# Patient Record
(demographics unavailable — no encounter records)

---

## 2025-01-28 NOTE — HISTORY OF PRESENT ILLNESS
[FreeTextEntry6] : bad eczema scratches often  does give benadryl and claritin at times uses steroids  2-3 days then stops

## 2025-01-28 NOTE — PHYSICAL EXAM
[NL] : regular rate and rhythm, normal S1, S2 audible, no murmurs [de-identified] : dry red hands

## 2025-04-30 NOTE — DISCUSSION/SUMMARY
[FreeTextEntry1] : drops and zyrtec  keep windows closed minimal outdoor play  sunglasses  wash nightly   albuterol 2 puffs every 4 hrs   rto tomorrow

## 2025-04-30 NOTE — PHYSICAL EXAM
[Wheezing] : wheezing [NL] : regular rate and rhythm, normal S1, S2 audible, no murmurs [FreeTextEntry5] : red and tearing. under eye  congestion  [FreeTextEntry7] : constant coughing

## 2025-04-30 NOTE — HISTORY OF PRESENT ILLNESS
[FreeTextEntry6] : very runny nose  cough   watery eyes  eyes are red and swollen as well   gave benadryl 2-3 days and claritin 2-3 days  mom says sx for 10 days

## 2025-05-01 NOTE — HISTORY OF PRESENT ILLNESS
[FreeTextEntry6] :  using albuterol every 4 hrs  taking Zyrtec using eye drops  pt says is coughing less

## 2025-05-01 NOTE — DISCUSSION/SUMMARY
[FreeTextEntry1] : albuterol 7 days  zyrtec and eye drops 1 month  less outdoors  hat, sunglasses, shower daily

## 2025-05-01 NOTE — PHYSICAL EXAM
[NL] : regular rate and rhythm, normal S1, S2 audible, no murmurs [FreeTextEntry5] : less red. allergic shiners